# Patient Record
Sex: MALE | Race: WHITE | Employment: OTHER | URBAN - METROPOLITAN AREA
[De-identification: names, ages, dates, MRNs, and addresses within clinical notes are randomized per-mention and may not be internally consistent; named-entity substitution may affect disease eponyms.]

---

## 2023-05-19 ENCOUNTER — ANESTHESIA EVENT (OUTPATIENT)
Dept: ENDOSCOPY | Age: 78
End: 2023-05-19
Payer: MEDICARE

## 2023-05-19 ENCOUNTER — ANESTHESIA (OUTPATIENT)
Dept: ENDOSCOPY | Age: 78
End: 2023-05-19
Payer: MEDICARE

## 2023-05-19 ENCOUNTER — HOSPITAL ENCOUNTER (OUTPATIENT)
Age: 78
Setting detail: OUTPATIENT SURGERY
Discharge: HOME | End: 2023-05-19
Attending: INTERNAL MEDICINE | Admitting: INTERNAL MEDICINE
Payer: MEDICARE

## 2023-05-19 VITALS
HEART RATE: 52 BPM | BODY MASS INDEX: 32.9 KG/M2 | DIASTOLIC BLOOD PRESSURE: 86 MMHG | TEMPERATURE: 97.2 F | WEIGHT: 235 LBS | RESPIRATION RATE: 16 BRPM | SYSTOLIC BLOOD PRESSURE: 146 MMHG | OXYGEN SATURATION: 96 % | HEIGHT: 71 IN

## 2023-05-19 DIAGNOSIS — K62.5 RECTAL BLEEDING: ICD-10-CM

## 2023-05-19 DIAGNOSIS — R10.13 DYSPEPSIA: ICD-10-CM

## 2023-05-19 PROCEDURE — 3700000001 HC ADD 15 MINUTES (ANESTHESIA): Performed by: INTERNAL MEDICINE

## 2023-05-19 PROCEDURE — 6360000002 HC RX W HCPCS

## 2023-05-19 PROCEDURE — 7100000011 HC PHASE II RECOVERY - ADDTL 15 MIN: Performed by: INTERNAL MEDICINE

## 2023-05-19 PROCEDURE — 2500000003 HC RX 250 WO HCPCS: Performed by: INTERNAL MEDICINE

## 2023-05-19 PROCEDURE — 3609012400 HC EGD TRANSORAL BIOPSY SINGLE/MULTIPLE: Performed by: INTERNAL MEDICINE

## 2023-05-19 PROCEDURE — 2709999900 HC NON-CHARGEABLE SUPPLY: Performed by: INTERNAL MEDICINE

## 2023-05-19 PROCEDURE — 7100000010 HC PHASE II RECOVERY - FIRST 15 MIN: Performed by: INTERNAL MEDICINE

## 2023-05-19 PROCEDURE — 88305 TISSUE EXAM BY PATHOLOGIST: CPT

## 2023-05-19 PROCEDURE — 2500000003 HC RX 250 WO HCPCS

## 2023-05-19 PROCEDURE — 3609010600 HC COLONOSCOPY POLYPECTOMY SNARE/COLD BIOPSY: Performed by: INTERNAL MEDICINE

## 2023-05-19 PROCEDURE — 3700000000 HC ANESTHESIA ATTENDED CARE: Performed by: INTERNAL MEDICINE

## 2023-05-19 PROCEDURE — 2580000003 HC RX 258: Performed by: ANESTHESIOLOGY

## 2023-05-19 RX ORDER — PROPOFOL 10 MG/ML
INJECTION, EMULSION INTRAVENOUS PRN
Status: DISCONTINUED | OUTPATIENT
Start: 2023-05-19 | End: 2023-05-19 | Stop reason: SDUPTHER

## 2023-05-19 RX ORDER — LIDOCAINE HYDROCHLORIDE 20 MG/ML
INJECTION, SOLUTION INFILTRATION; PERINEURAL PRN
Status: DISCONTINUED | OUTPATIENT
Start: 2023-05-19 | End: 2023-05-19 | Stop reason: SDUPTHER

## 2023-05-19 RX ORDER — AMLODIPINE BESYLATE 5 MG/1
5 TABLET ORAL DAILY
COMMUNITY
Start: 2023-05-13

## 2023-05-19 RX ORDER — ATORVASTATIN CALCIUM 40 MG/1
40 TABLET, FILM COATED ORAL DAILY
COMMUNITY
Start: 2023-05-13

## 2023-05-19 RX ORDER — SODIUM CHLORIDE 0.9 % (FLUSH) 0.9 %
5-40 SYRINGE (ML) INJECTION PRN
Status: DISCONTINUED | OUTPATIENT
Start: 2023-05-19 | End: 2023-05-19 | Stop reason: HOSPADM

## 2023-05-19 RX ORDER — LIDOCAINE HYDROCHLORIDE 10 MG/ML
1 INJECTION, SOLUTION EPIDURAL; INFILTRATION; INTRACAUDAL; PERINEURAL
Status: DISCONTINUED | OUTPATIENT
Start: 2023-05-19 | End: 2023-05-19 | Stop reason: HOSPADM

## 2023-05-19 RX ORDER — SODIUM CHLORIDE 0.9 % (FLUSH) 0.9 %
5-40 SYRINGE (ML) INJECTION EVERY 12 HOURS SCHEDULED
Status: DISCONTINUED | OUTPATIENT
Start: 2023-05-19 | End: 2023-05-19 | Stop reason: HOSPADM

## 2023-05-19 RX ORDER — SODIUM CHLORIDE, SODIUM LACTATE, POTASSIUM CHLORIDE, CALCIUM CHLORIDE 600; 310; 30; 20 MG/100ML; MG/100ML; MG/100ML; MG/100ML
INJECTION, SOLUTION INTRAVENOUS CONTINUOUS
Status: DISCONTINUED | OUTPATIENT
Start: 2023-05-19 | End: 2023-05-19 | Stop reason: HOSPADM

## 2023-05-19 RX ADMIN — PROPOFOL 25 MG: 10 INJECTION, EMULSION INTRAVENOUS at 08:53

## 2023-05-19 RX ADMIN — SODIUM CHLORIDE, POTASSIUM CHLORIDE, SODIUM LACTATE AND CALCIUM CHLORIDE: 600; 310; 30; 20 INJECTION, SOLUTION INTRAVENOUS at 08:10

## 2023-05-19 RX ADMIN — PROPOFOL 25 MG: 10 INJECTION, EMULSION INTRAVENOUS at 09:03

## 2023-05-19 RX ADMIN — PROPOFOL 25 MG: 10 INJECTION, EMULSION INTRAVENOUS at 09:11

## 2023-05-19 RX ADMIN — PROPOFOL 25 MG: 10 INJECTION, EMULSION INTRAVENOUS at 09:00

## 2023-05-19 RX ADMIN — PROPOFOL 25 MG: 10 INJECTION, EMULSION INTRAVENOUS at 08:58

## 2023-05-19 RX ADMIN — PROPOFOL 100 MG: 10 INJECTION, EMULSION INTRAVENOUS at 08:50

## 2023-05-19 RX ADMIN — PROPOFOL 25 MG: 10 INJECTION, EMULSION INTRAVENOUS at 09:07

## 2023-05-19 RX ADMIN — LIDOCAINE HYDROCHLORIDE 100 MG: 20 INJECTION, SOLUTION INFILTRATION; PERINEURAL at 08:50

## 2023-05-19 ASSESSMENT — PAIN - FUNCTIONAL ASSESSMENT: PAIN_FUNCTIONAL_ASSESSMENT: 0-10

## 2023-05-19 NOTE — DISCHARGE INSTRUCTIONS
ENDOSCOPY DISCHARGE INSTRUCTIONS:    Call the physician that did your procedure for any questions or concerns:           DR. Jackson Pel:  [phone]               ACTIVITY:    There are potential side effects from the medications used for sedation and anesthesia during your procedure. These include:  Dizziness or light-headedness, confusion or memory loss, delayed reaction times, loss of coordination, nausea and vomiting. Because of your increased risk for injury, we ask that you observe the following precautions: For the next 24 hours,  DO NOT operate an automobile, bicycle, motorcycle, , power tools or large equipment of any kind. Do not drink alcohol, sign any legal documents or make any legal decisions for 24 hours. Do not bend your head over lower than your heart. DO sit on the side of bed/couch awhile before getting up. Plan on bedrest or quiet relaxation today. You may resume normal activities in 24 hours. DIET:    Your first meal today should be light, avoiding spicy and fatty foods. If you tolerate this first meal, then you may advance to your regular diet unless otherwise advised by your physician. NORMAL SYMPTOMS:  -Mild sore throat if youve had an EGD   -Gaseous discomfort if you've had an EGD or Colonoscopy. NOTIFY YOUR PHYSICIAN IF THESE SYMPTOMS OCCUR:  1. Fever (greater than 100)  5. Increased abdominal bloating  2. Severe pain    6. Excessive bleeding  3. Nausea and vomiting  7. Chest pain                                                                    4. Chills    8. Shortness of breath      ADDITIONAL INSTRUCTIONS:    Biopsy results: To be discussed at your follow-up visit. Educational Information:    Follow up: Schedule an appointment with Dr. Pacheco Carlson for two weeks from now if you have not already done so. Please review these discharge instructions this evening or tomorrow for  information you may have forgotten.             We want to thank you for choosing the

## 2023-05-19 NOTE — PROGRESS NOTES
Ambulatory Surgery/Procedure Discharge Note    Vitals:    05/19/23 0940   BP: (!) 146/86   Pulse: 52   Resp: 16   Temp:    SpO2: 96%   Bp meets fletcher criteria for discharge home. In: 500 [I.V.:500]  Out: 0     Restroom use offered before discharge. Yes    Pain assessment:  none  Pain Level: 0    Pt states readiness to discharge home. No complaints of pain or nausea. Discharge instructions discussed with patient and patient's wife. Dr. Henley Fell at bedside in recovery. X1 prescription given to patient. Patient discharged to home/self care.  Patient discharged via wheel chair by transporter to waiting family/S.O.       5/19/2023 9:49 AM

## 2023-05-19 NOTE — H&P
History and Physical / Pre-Sedation Assessment    Patient:  Harry Ott   :        Intended Procedure:  egd and colonoscopy    HPI: rectal bleed. Diarrhea. Abdominal pain with dyspepsia    Past Medical History:   has a past medical history of Hyperlipidemia and Hypertension. Past Surgical History:   has a past surgical history that includes Colonoscopy and Nasal septum surgery. Medications:  Prior to Admission medications    Medication Sig Start Date End Date Taking? Authorizing Provider   atorvastatin (LIPITOR) 40 MG tablet Take 1 tablet by mouth daily 23   Historical Provider, MD   amLODIPine (NORVASC) 5 MG tablet Take 1 tablet by mouth daily 23   Historical Provider, MD       Family History:  family history is not on file. Social History:   reports that he has never smoked. He has never used smokeless tobacco. He reports current alcohol use. He reports that he does not use drugs. Allergies:  Patient has no known allergies. ROS:  twelve point system review was unremarkable except for above noted history. Nurses notes reviewed and agreed. Medications reviewed    Physical Exam:  Vital Signs: /76   Pulse 60   Temp 97.1 °F (36.2 °C) (Temporal)   Resp 18   Ht 5' 10.5\" (1.791 m)   Wt 235 lb (106.6 kg)   SpO2 96%   BMI 33.24 kg/m²    Skin: normal  HEENT: normal  Neck: supple. No adenopathy. No thyromegaly. No JVD. Pulmonary:Normal  Cardiac:Normal  Abdomen:Normal  MS: normal  Neuro: normal  Ext: no edema. Pulses normal    Pre-Procedure Assessment / Plan:  ASA 2 - Patient with mild systemic disease with no functional limitations  Mallampati Airway Assessment:  Mallampati Class II - (soft palate, fauces & uvula are visible)  Level of Sedation Plan: Moderate sedation  Post Procedure plan: Return to same level of care    I assessed the patient and find that the patient is in satisfactory condition to proceed with the planned procedure and sedation plan.     I

## 2023-05-19 NOTE — ANESTHESIA PRE PROCEDURE
Department of Anesthesiology  Preprocedure Note       Name:  Shira Gann   Age:  77 y.o.  :  1945                                          MRN:  [id number]         Date:  2023      Surgeon: Carina Pineda):  Ashutosh Avila MD    Procedure: Procedure(s):  ESOPHAGOGASTRODUODENOSCOPY  COLONOSCOPY    Medications prior to admission:   Prior to Admission medications    Medication Sig Start Date End Date Taking? Authorizing Provider   atorvastatin (LIPITOR) 40 MG tablet Take 1 tablet by mouth daily 23   Historical Provider, MD   amLODIPine (NORVASC) 5 MG tablet Take 1 tablet by mouth daily 23   Historical Provider, MD       Current medications:    Current Facility-Administered Medications   Medication Dose Route Frequency Provider Last Rate Last Admin    lidocaine PF 1 % injection 1 mL  1 mL IntraDERmal Once PRN Viktoriya Machuca MD        lactated ringers IV soln infusion   IntraVENous Continuous Viktoriya Machuca  mL/hr at 23 0810 New Bag at 23 0810    sodium chloride flush 0.9 % injection 5-40 mL  5-40 mL IntraVENous 2 times per day Viktoriya Machuca MD        sodium chloride flush 0.9 % injection 5-40 mL  5-40 mL IntraVENous PRN Viktoriya Machuca MD           Allergies:  No Known Allergies    Problem List:  There is no problem list on file for this patient.       Past Medical History:        Diagnosis Date    Hyperlipidemia     Hypertension        Past Surgical History:        Procedure Laterality Date    COLONOSCOPY      NASAL SEPTUM SURGERY         Social History:    Social History     Tobacco Use    Smoking status: Never    Smokeless tobacco: Never   Substance Use Topics    Alcohol use: Yes     Comment: ocassional                                Counseling given: Not Answered      Vital Signs (Current):   Vitals:    23 0801   BP: 135/76   Pulse: 60   Resp: 18   Temp: 97.1 °F (36.2 °C)   TempSrc: Temporal   SpO2: 96%   Weight: 235 lb (106.6 kg)   Height: 5' 10.5\"

## 2023-05-19 NOTE — ANESTHESIA POSTPROCEDURE EVALUATION
Department of Anesthesiology  Postprocedure Note    Patient: Kamran Aponte  MRN: [id number]  Birthdate: [birth year]  Date of evaluation: 5/19/2023      Procedure Summary     Date: 05/19/23 Room / Location: [place] [id number] / [facility]    Anesthesia Start: 4390 Anesthesia Stop: 2540    Procedures:       EGD BIOPSY      COLONOSCOPY POLYPECTOMY SNARE/COLD BIOPSY Diagnosis:       Dyspepsia      Rectal bleeding      (Dyspepsia [R10.13])      (Rectal bleeding [K62.5])    Surgeons: Paolo Gotti MD Responsible Provider: Evelyne Cunha MD    Anesthesia Type: MAC ASA Status: 2          Anesthesia Type: No value filed.     Prabhu Phase I: Prabhu Score: 10    Prabhu Phase II: Prabhu Score: 10      Anesthesia Post Evaluation    Patient location during evaluation: PACU  Patient participation: complete - patient participated  Level of consciousness: awake  Pain score: 0  Nausea & Vomiting: no nausea and no vomiting  Complications: no  Cardiovascular status: blood pressure returned to baseline  Respiratory status: acceptable  Hydration status: euvolemic

## 2023-05-20 NOTE — OP NOTE
[facility] [address], [address]                                OPERATIVE REPORT    PATIENT NAME: Latrice Perez               :        1945  MED REC NO:   [id number]                          ROOM:  ACCOUNT NO:   [de-identified]                           ADMIT DATE: 2023  PROVIDER:     Love Reilly MD    DATE OF PROCEDURE:  2023    SURGEON:  Love Reilly MD    INDICATIONS FOR PROCEDURE:  1. Abdominal pain with dyspepsia. 2.  Rectal bleed. DESCRIPTION OF PROCEDURE:  EGD:  With the patient in the left lateral position and after IV  Diprivan, the Olympus video endoscope was introduced into esophagus and  advanced towards the gastroesophageal junction. The esophagus was  normal except for very small hiatus hernia. The stomach was carefully  inspected. Minimal gastritis was seen and biopsies were obtained for  Helicobacter pylori. The duodenum was normal.  Scope was then removed  without complication. COLONOSCOPY:  The Olympus video colonoscope was then inserted into the  rectum and carefully advanced to the cecum. Minor diverticulosis of the  colon was seen. A 1-cm polyp was noted in the descending colon. We  removed it with the polypectomy snare technique. Another polyp was  noted in the ascending colon. We removed it with the biopsy forceps  technique. Careful inspection revealed no other abnormality. Procedure  was terminated without complications. IMPRESSION:  1. Minor antral gastritis. 2.  Minor diverticulosis of the colon. 3.  Descending colon polyp. 4.  Ascending colon polyp. ESTIMATED BLOOD LOSS:  None. RECOMMENDATIONS:  Surveillance colonoscopy is recommended in five years. Vida Briceno MD    D: 2023 9:37:55       T: 2023 13:29:29     [email or web address]  Job#: [id number]     Doc#: [id number]    CC:   Love Reilly MD

## (undated) DEVICE — FORCEPS BX L240CM JAW DIA2.8MM L CAP W/ NDL MIC MESH TOOTH

## (undated) DEVICE — TRAP SPEC RETRV CLR PLAS POLYP IN LN SUCT QUIK CTCH

## (undated) DEVICE — SNARE COLD DIAMOND 10MM THIN